# Patient Record
(demographics unavailable — no encounter records)

---

## 2024-10-16 NOTE — ASSESSMENT
[FreeTextEntry1] : The patient is specifically interested in a double incision top surgery technique via []. They express understanding of the risks associated with surgery as listed above. To proceed, they will need 2 letters of assessment in accordance with [place] Medicaid guidelines.  I, Dr. Simpson, personally performed the evaluation and management (E/M) services for this established patient who presents today with continued gender dysphoria. That E/M includes conducting the clinically appropriate interval history &/or exam, assessing all new/exacerbated conditions, and establishing a new plan of care. Today, my FELIPE, Abdulaziz Tyson PA-C, was here to observe my evaluation and management service for this continued condition and follow the plan of care established by me going forward.

## 2024-10-16 NOTE — HISTORY OF PRESENT ILLNESS
[FreeTextEntry1] : Patient returns for further discussion regarding top surgery technique.  He denies any significant physical or mental health changes since last visit.

## 2024-10-31 NOTE — REASON FOR VISIT
[Follow-Up: _____] : a [unfilled] follow-up visit [FreeTextEntry1] : further top surgery technique discussion

## 2024-10-31 NOTE — HISTORY OF PRESENT ILLNESS
[FreeTextEntry1] : Patient returns for further discussion of top surgery technique. We reviewed the advantages and disadvantages of infra-areolar double incision, pedicle, and periareolar techniques. The patient expresses preference for the infra-areolar technique. They express understanding regarding the likelihood that their nipple-areola complexes will remain relatively large, which can be addressed with an in-office revision procedure.

## 2024-10-31 NOTE — ASSESSMENT
[FreeTextEntry1] : The patient is specifically interested in a double incision top surgery technique via an infra-areolar approach.   The patient has a mental health letter of assessment from Maine Regalado LCSW. The patient has a second letter of assessment from Shaheen Au NP. We will submit for insurance authorization.  I, Dr. Simpson, personally performed the evaluation and management (E/M) services for this established patient who presents today with continued gender dysphoria. That E/M includes conducting the clinically appropriate interval history &/or exam, assessing all new/exacerbated conditions, and establishing a new plan of care. Today, my FELIPE, Abdulaziz Tyson PA-C, was here to observe my evaluation and management service for this continued condition and follow the plan of care established by me going forward.